# Patient Record
Sex: FEMALE | Race: WHITE | Employment: UNEMPLOYED | URBAN - METROPOLITAN AREA
[De-identification: names, ages, dates, MRNs, and addresses within clinical notes are randomized per-mention and may not be internally consistent; named-entity substitution may affect disease eponyms.]

---

## 2021-09-28 ENCOUNTER — HOSPITAL ENCOUNTER (OUTPATIENT)
Dept: PHYSICAL THERAPY | Age: 76
Discharge: HOME | End: 2021-09-28
Payer: COMMERCIAL

## 2021-09-28 PROCEDURE — 97163 PT EVAL HIGH COMPLEX 45 MIN: CPT

## 2021-09-28 NOTE — PROGRESS NOTES
[facility]  [address] [facility] [address] Bever[address] Schae[address], [address] [facility]  Ph [phone]  Fx [phone]    Plan of Care/ Statement of Necessity for Physical Therapy Services    Patient name: Eduard Gil Start of Care: 2021   Referral source: Rivera Duran MD : 1945    Medical Diagnosis: Lymphedema of both lower extremities [I89.0]  Lymphedema of both upper extremities [I89.0]   Onset Date:    Treatment Diagnosis: Lymphedema of Both LE                                  ICD-10: I89.0   Prior Hospitalization: see medical history Provider#: [id number]   Medications: Verified on Patient summary List    Comorbidities: Pt had a fall with a femur fracture in January this year with ORIF and pt was in the bed since. A fib, Stage 3 kidney disease, GERD, hypothyroidism, lipidedema    Prior Level of Function: functionally independent, walked short distances with  AD, sedentary lifestyle      The Plan of Care and following information is based on the information from the initial evaluation. Assessment/ key information: 69 yo female who [facility] to [facility] with c/o Cali leg lymphedema. Patient lives in a nursing home about an hour and a half away. Pt is bed bound at the facility and needs assistance for supine to sit transfers. Pt reports being unable to stand. Pt has a diagnosis and consistent presentation of lipidemia on Both legs. Pt reports that she has had compression bandaging in the past and it has worked well. Patient reports that she fell in January this year and resulted in a Right femur fracture and is s/p ORIF. Patient has reported that the swelling has increased since. Pt reports that she is having cont problems with drainage in Cali LE due to the swelling. Pt reports increased pain in Cali LE due to swelling.   Patient demonstrates decreased ROM, decreased strength, impaired posture, pain and decreased functional mobility tolerance.     Patient would benefit from daily care with compression bandaging however due to pt living in a nursing home and her drive being an hour and half away, I recommend pt receiving compression velcro garments. Recommend [employer] brand due to limited straps. Pt will need lower leg, knee and thigh garments. Pt given print outs of garments for ordering. Pt would also benefit from 1x week MLD and exercise for pain associated with lipidemia. Evaluation Complexity History HIGH Complexity :3+ comorbidities / personal factors will impact the outcome/ POC ; Examination HIGH Complexity : 4+ Standardized tests and measures addressing body structure, function, activity limitation and / or participation in recreation  ;Presentation HIGH Complexity : Unstable and unpredictable characteristics  ; Clinical Decision Making HIGH Complexity : FOTO score of 1- 25  : FOTO score = an established functional score where 100 = no disability  Overall Complexity Rating: HIGH     Problem List: pain affecting function, decrease ROM, decrease strength, edema affecting function, impaired gait/ balance, decrease ADL/ functional abilitiies, decrease activity tolerance, decrease flexibility/ joint mobility and decrease transfer abilities   Treatment Plan may include any combination of the following: Therapeutic exercise, Therapeutic activities, Neuromuscular re-education, Manual therapy, Patient education and Self Care training    Patient / Family readiness to learn indicated by: asking questions, trying to perform skills and interest  Persons(s) to be included in education: patient (P)  Barriers to Learning/Limitations: yes;  cognitive  Measures taken if barriers to learning: given written instructions  Patient Goal (s): to be able to stand up  Patient Self Reported Health Status: fair  Rehabilitation Potential: fair    Short Term Goals: To be accomplished in 2 weeks:  1. Patient will be independent and compliant with HEP to progress toward goals and restore functional mobility.    Jeffrey Status: issued at eval     2. Patient will improve FOTO score by 7 points to improve functional tolerance for exercise. Eval Status: FOTO 2  FOTO score = an established functional score where 100 = no disability     3. Patient will improve pain in madhu  LE to 3/10  to improve clothing tolerance and restore prior level of function. Eval Status: 6/10 at worst     4. Pt will have ordered compression garment for Madhu LE. Eval Status: Pt given information about velcro garment.            5. Pt will decrease Right leg circumference measurement by 3 centimeters in thigh in order to improve functional mobility  Eval Status: 98cm                 Long Term Goals: To be accomplished in 4 weeks:  1. Patient will improve FOTO score by 14 points to improve functional tolerance for functional movements. Eval Status: FOTO 2  FOTO score = an established functional score where 100 = no disability     2. Pt will have decreased pain in Madhu LE to 1-2/10 to aid in functional mechanics for ADLs. Eval Status: 6/10 at worst          3. Pt will be able to phuc/doff compression garment with assistance in order to maintain fluid management.            4. Pt will decrease total circumference measurements of Left leg by 10 cm. Frequency / Duration: Patient to be seen 1 times per week for 4 weeks. Patient/ Caregiver education and instruction: Diagnosis, prognosis, self care, activity modification and exercises   [x]  Plan of care has been reviewed with LEENA Matthews, PT 9/28/2021 1:45 PM    ________________________________________________________________________    I certify that the above Therapy Services are being furnished while the patient is under my care. I agree with the treatment plan and certify that this therapy is necessary.     Physician's Signature:_____________________Date:____________TIME:________                                      Lacy Parks MD      ** Signature, Date and Time must be completed for valid certification **  Please sign and return to [facility]  [address], [address] [facility]  Ph [phone]  Fx [phone]

## 2021-09-28 NOTE — PROGRESS NOTES
PT DAILY TREATMENT NOTE/LYMPHLEG EVAL 10-18    Patient Name: Jose Poe  Date:2021  : 1945  [x]  Patient  Verified  Payor: [employer] MEDICAID / Plan: 1 [facility] [address] / Product Type: Managed Care Medicaid /    In time:149  Out time:310  Total Treatment Time (min): 81  Visit #: 1 of 4    Treatment Area: Lymphedema of both lower extremities [I89.0]  Lymphedema of both upper extremities [I89.0]    SUBJECTIVE  Pain Level (0-10 scale): 0/10  []constant [x]intermittent []improving []worsening []no change since onset    Any medication changes, allergies to medications, adverse drug reactions, diagnosis change, or new procedure performed?: [x] No    [] Yes (see summary sheet for update)  Subjective functional status/changes:     PLOF: functionally independent, no AD, sedentary lifestyle  Limitations to PLOF: pt has been unable to tolerate standing yet. Pt is unable to wear pants. She has pain with sleeping, she is unable to tolerate touch well. Current symptoms/Complaints: 0/10 at best with positioning; 6/10 at worst with pulling on leg; pt reports they are unable to sleep through the night secondary to pain  PMHx/Surgical Hx: Pt had a fall with a femur fracture in January this year with ORIF and pt was in the bed since. A fib, Stage 3 kidney disease, GERD, hypothyroidism, lipidedema     PT reports that she has just now been able to sit up but not stand up yet. Pt reports that lymphedema onset was when she was 21 and the doctors didn't really know what was wrong. Lymphedema History: [x] Primary lipidedema Likely as she reports swelling as early as when she was 21years old.    Work Hx: NA  Living Situation: lives in a nursing facility 24 hour care   Pt Goals: \"to be able to stand up\"  Barriers: []pain [x]financial [x]time [x]transportation []other  Motivation: good  Substance use: []Alcohol []Tobacco []other:   Cognition: A & O x 3        OBJECTIVE    81 min [x]Eval []Re-Eval              With   [] TE   [] TA   [] neuro   [] other: Patient Education: [x] Review HEP    [] Progressed/Changed HEP based on:   [] positioning   [] body mechanics   [] transfers   [] heat/ice application    [x] other: garments and compression,deep breathing,        General Evaluation    Skin inspection: [x] Fibrosis, [] Hyperkeratosis, [] Fungal infection, [] Papillomas, [] Lymphorrhea, [] wounds, [] color changes: [x] Red or [] brown, [] pitting      Fibrosis hyperkeratosis on Left anterior shin, Left medial thigh   Fibrosis lateral Right thigh around right knee     ROM:                               Grossly limited in knee flexion and hip flexion. Strength:  Grossly limited as pt is unable to lift either leg in any direction. Pt is unable to perform supine <> sit or sit to stand. Circumference measurements Left Right   Big toe 8.5 9   Forefoot  27 26   Figure 8 63 64   Malleoli  33 32.5   10 cm proximal to malleoli 58 54   10 cm distal to knee 77.5 76.5   Patella  77.5 69.5   10cm proximal to knee 101.5 98     Functional Mobility      Bed Mobility:      Scooting: unable with out assistance       Rolling: unable with out assistance       Sit-Supine: unable with out assistance        Transfers:       Sit-Stand: unable    Other Tests / Comments:  Pt given Tubigrip for Cali LE from hip to ankle. Pain Level (0-10 scale) post treatment: 0/10    ASSESSMENT/Changes in Function: 69 yo female who presents to [facility] with c/o Cali leg lymphedema. Patient lives in a nursing home about an hour and a half away. Pt is bed bound at the facility and needs assistance for supine to sit transfers. Pt reports being unable to stand. Pt has a diagnosis and consistent presentation of lipidemia on Both legs. Pt reports that she has had compression bandaging in the past and it has worked well.   Patient reports that she fell in January this year and resulted in a Right femur fracture and is s/p ORIF. Patient has reported that the swelling has increased since. Pt reports that she is having cont problems with drainage in Cali LE due to the swelling. Pt reports increased pain in Cali LE due to swelling. Patient demonstrates decreased ROM, decreased strength, impaired posture, pain and decreased functional mobility tolerance. Patient would benefit from daily care with compression bandaging however due to pt living in a nursing home and her drive being an hour and half away, I recommend pt receiving compression velcro garments. Recommend [employer] brand due to limited straps. Pt will need lower leg, knee and thigh garments. Pt given print outs of garments for ordering. Pt would also benefit from 1x week MLD and exercise for pain associated with lipidemia. [x]  See Plan of Care  []  See progress note/recertification  []  See Discharge Summary         Progress towards goals / Updated goals:  Short Term Goals: To be accomplished in 2 weeks:  1. Patient will be independent and compliant with HEP to progress toward goals and restore functional mobility. Eval Status: issued at eval    2. Patient will improve FOTO score by 7 points to improve functional tolerance for exercise. Eval Status: FOTO 2  FOTO score = an established functional score where 100 = no disability    3. Patient will improve pain in cali  LE to 3/10  to improve clothing tolerance and restore prior level of function. Eval Status: 6/10 at worst    4. Pt will have ordered compression garment for Cali LE. Eval Status: Pt given information about velcro garment. 5.  Pt will decrease Right leg circumference measurement by 3 centimeters in thigh in order to improve functional mobility  Eval Status: 98cm              Long Term Goals: To be accomplished in 4 weeks:  1. Patient will improve FOTO score by 14 points to improve functional tolerance for functional movements.   Eval Status: FOTO 2  FOTO score = an established functional score where 100 = no disability    2. Pt will have decreased pain in Cali LE to 1-2/10 to aid in functional mechanics for ADLs. Eval Status: 6/10 at worst         3. Pt will be able to phuc/doff compression garment with assistance in order to maintain fluid management. 4.  Pt will decrease total circumference measurements of Left leg by 10 cm.     PLAN  [x]  Upgrade activities as tolerated     [x]  Continue plan of care  [x]  Update interventions per flow sheet       []  Discharge due to:_  []  Other:_      Joy San, PT 9/28/2021  1:45 PM

## 2021-10-05 ENCOUNTER — APPOINTMENT (OUTPATIENT)
Dept: PHYSICAL THERAPY | Age: 76
End: 2021-10-05

## 2021-10-05 NOTE — PROGRESS NOTES
[facility]  [address], [address] [facility]  Ph [phone]  Fx [phone]    Physical Therapy Discharge Summary    Patient name: Cosme Cho Start of Care: 2021   Referral source: Daniel George MD : 1945                Medical Diagnosis: Lymphedema of both lower extremities [I89.0]  Lymphedema of both upper extremities [I89.0]    Onset Date:                Treatment Diagnosis: Lymphedema of Both LE                                  ICD-10: I89.0   Prior Hospitalization: see medical history Provider#: [id number]   Medications: Verified on Patient summary List    Comorbidities: Pt had a fall with a femur fracture in January this year with ORIF and pt was in the bed since.  A fib, Stage 3 kidney disease, GERD, hypothyroidism, lipidedema    Prior Level of Function: functionally independent, walked short distances with  AD, sedentary lifestyle    Visits from Start of Care: 1    Missed Visits: 1    Reporting Period : 21 to 10/5/21    Goals/Measure of Progress:  Short Term Goals: To be accomplished in 2 weeks:  1. Patient will be independent and compliant with HEP to progress toward goals and restore functional mobility. Eval Status: issued at eval     2. Patient will improve FOTO score by 7 points to improve functional tolerance for exercise. Eval Status: FOTO 2  FOTO score = an established functional score where 100 = no disability     3. Patient will improve pain in madhu  LE to 3/10  to improve clothing tolerance and restore prior level of function. Eval Status: 6/10 at worst     4. Pt will have ordered compression garment for Madhu LE. Eval Status: Pt given information about velcro garment.            5. Pt will decrease Right leg circumference measurement by 3 centimeters in thigh in order to improve functional mobility  Eval Status: 98cm                 Long Term Goals: To be accomplished in 4 weeks:  1.  Patient will improve FOTO score by 14 points to improve functional tolerance for functional movements. Eval Status: FOTO 2  FOTO score = an established functional score where 100 = no disability     2. Pt will have decreased pain in Cali LE to 1-2/10 to aid in functional mechanics for ADLs. Eval Status: 6/10 at worst          3. Pt will be able to phuc/doff compression garment with assistance in order to maintain fluid management.            4. Pt will decrease total circumference measurements of Left leg by 10 cm.     Assessment/ Summary of Care:  Unable to formally assess goals as pt failed to show for scheduled followup appts. Per Alyssia Dobbs, Pt has requested DC and canceled appointments. Please see above for goals assessment while pt was current under the care of this clinic. Please DC to HEP at this time. Thank you for this referral. Pt will require new order if he/she requires further rehab services. RECOMMENDATIONS:  [x]Discontinue therapy: [x]Patient has requested DC      []Patient is non-compliant or has abdicated      []Due to lack of appreciable progress towards set goals    Bee Bhatia, PT 10/5/2021 3:11 PM    ------------------------------------------------------------------------------------------------------------------------------  NOTE TO PHYSICIAN:  Please complete the following and fax to: [facility] at [phone]  If you are unable to process this request in   24 hours, please contact our office.      [] I have read the above report and request that my patient continue therapy with the following changes/special instructions:  [] I have read the above report and request that my patient be discharged from therapy    Physician's Signature:____________________ Date:_________ TIME:________                                      Lashanda Morales MD      ** Signature, Date and Time must be completed for valid certification **

## 2021-10-11 ENCOUNTER — APPOINTMENT (OUTPATIENT)
Dept: PHYSICAL THERAPY | Age: 76
End: 2021-10-11

## 2021-10-18 ENCOUNTER — APPOINTMENT (OUTPATIENT)
Dept: PHYSICAL THERAPY | Age: 76
End: 2021-10-18